# Patient Record
Sex: MALE | Race: WHITE | NOT HISPANIC OR LATINO
[De-identification: names, ages, dates, MRNs, and addresses within clinical notes are randomized per-mention and may not be internally consistent; named-entity substitution may affect disease eponyms.]

---

## 2020-05-23 ENCOUNTER — TRANSCRIPTION ENCOUNTER (OUTPATIENT)
Age: 55
End: 2020-05-23

## 2021-08-13 PROBLEM — Z00.00 ENCOUNTER FOR PREVENTIVE HEALTH EXAMINATION: Status: ACTIVE | Noted: 2021-08-13

## 2021-08-18 ENCOUNTER — APPOINTMENT (OUTPATIENT)
Dept: ELECTROPHYSIOLOGY | Facility: CLINIC | Age: 56
End: 2021-08-18
Payer: COMMERCIAL

## 2021-08-18 VITALS
HEIGHT: 71 IN | BODY MASS INDEX: 44.1 KG/M2 | WEIGHT: 315 LBS | HEART RATE: 61 BPM | SYSTOLIC BLOOD PRESSURE: 113 MMHG | DIASTOLIC BLOOD PRESSURE: 80 MMHG

## 2021-08-18 DIAGNOSIS — Z45.010 ENCOUNTER FOR CHECKING AND TESTING OF CARDIAC PACEMAKER PULSE GENERATOR [BATTERY]: ICD-10-CM

## 2021-08-18 PROCEDURE — 99203 OFFICE O/P NEW LOW 30 MIN: CPT

## 2021-08-18 PROCEDURE — 93000 ELECTROCARDIOGRAM COMPLETE: CPT

## 2021-11-16 ENCOUNTER — OUTPATIENT (OUTPATIENT)
Dept: OUTPATIENT SERVICES | Facility: HOSPITAL | Age: 56
LOS: 1 days | End: 2021-11-16
Payer: COMMERCIAL

## 2021-11-16 VITALS
TEMPERATURE: 98 F | SYSTOLIC BLOOD PRESSURE: 130 MMHG | HEART RATE: 79 BPM | OXYGEN SATURATION: 97 % | DIASTOLIC BLOOD PRESSURE: 74 MMHG | RESPIRATION RATE: 20 BRPM | WEIGHT: 315 LBS | HEIGHT: 70 IN

## 2021-11-16 DIAGNOSIS — Z96.641 PRESENCE OF RIGHT ARTIFICIAL HIP JOINT: Chronic | ICD-10-CM

## 2021-11-16 DIAGNOSIS — Z01.818 ENCOUNTER FOR OTHER PREPROCEDURAL EXAMINATION: ICD-10-CM

## 2021-11-16 DIAGNOSIS — Z95.0 PRESENCE OF CARDIAC PACEMAKER: Chronic | ICD-10-CM

## 2021-11-16 DIAGNOSIS — Z98.890 OTHER SPECIFIED POSTPROCEDURAL STATES: Chronic | ICD-10-CM

## 2021-11-16 LAB
ANION GAP SERPL CALC-SCNC: 12 MMOL/L — SIGNIFICANT CHANGE UP (ref 5–17)
APTT BLD: 38.2 SEC — HIGH (ref 27.5–35.5)
BASOPHILS # BLD AUTO: 0.06 K/UL — SIGNIFICANT CHANGE UP (ref 0–0.2)
BASOPHILS NFR BLD AUTO: 0.7 % — SIGNIFICANT CHANGE UP (ref 0–2)
BLD GP AB SCN SERPL QL: SIGNIFICANT CHANGE UP
BUN SERPL-MCNC: 11.6 MG/DL — SIGNIFICANT CHANGE UP (ref 8–20)
CALCIUM SERPL-MCNC: 8.9 MG/DL — SIGNIFICANT CHANGE UP (ref 8.6–10.2)
CHLORIDE SERPL-SCNC: 102 MMOL/L — SIGNIFICANT CHANGE UP (ref 98–107)
CO2 SERPL-SCNC: 25 MMOL/L — SIGNIFICANT CHANGE UP (ref 22–29)
CREAT SERPL-MCNC: 0.71 MG/DL — SIGNIFICANT CHANGE UP (ref 0.5–1.3)
EOSINOPHIL # BLD AUTO: 0.13 K/UL — SIGNIFICANT CHANGE UP (ref 0–0.5)
EOSINOPHIL NFR BLD AUTO: 1.6 % — SIGNIFICANT CHANGE UP (ref 0–6)
GLUCOSE SERPL-MCNC: 99 MG/DL — SIGNIFICANT CHANGE UP (ref 70–99)
HCT VFR BLD CALC: 44.9 % — SIGNIFICANT CHANGE UP (ref 39–50)
HGB BLD-MCNC: 14.5 G/DL — SIGNIFICANT CHANGE UP (ref 13–17)
IMM GRANULOCYTES NFR BLD AUTO: 0.4 % — SIGNIFICANT CHANGE UP (ref 0–1.5)
INR BLD: 1.07 RATIO — SIGNIFICANT CHANGE UP (ref 0.88–1.16)
LYMPHOCYTES # BLD AUTO: 2.24 K/UL — SIGNIFICANT CHANGE UP (ref 1–3.3)
LYMPHOCYTES # BLD AUTO: 27.4 % — SIGNIFICANT CHANGE UP (ref 13–44)
MAGNESIUM SERPL-MCNC: 2 MG/DL — SIGNIFICANT CHANGE UP (ref 1.6–2.6)
MCHC RBC-ENTMCNC: 30.9 PG — SIGNIFICANT CHANGE UP (ref 27–34)
MCHC RBC-ENTMCNC: 32.3 GM/DL — SIGNIFICANT CHANGE UP (ref 32–36)
MCV RBC AUTO: 95.5 FL — SIGNIFICANT CHANGE UP (ref 80–100)
MONOCYTES # BLD AUTO: 0.88 K/UL — SIGNIFICANT CHANGE UP (ref 0–0.9)
MONOCYTES NFR BLD AUTO: 10.8 % — SIGNIFICANT CHANGE UP (ref 2–14)
NEUTROPHILS # BLD AUTO: 4.83 K/UL — SIGNIFICANT CHANGE UP (ref 1.8–7.4)
NEUTROPHILS NFR BLD AUTO: 59.1 % — SIGNIFICANT CHANGE UP (ref 43–77)
PLATELET # BLD AUTO: 201 K/UL — SIGNIFICANT CHANGE UP (ref 150–400)
POTASSIUM SERPL-MCNC: 3.6 MMOL/L — SIGNIFICANT CHANGE UP (ref 3.5–5.3)
POTASSIUM SERPL-SCNC: 3.6 MMOL/L — SIGNIFICANT CHANGE UP (ref 3.5–5.3)
PROTHROM AB SERPL-ACNC: 12.4 SEC — SIGNIFICANT CHANGE UP (ref 10.6–13.6)
RBC # BLD: 4.7 M/UL — SIGNIFICANT CHANGE UP (ref 4.2–5.8)
RBC # FLD: 12.7 % — SIGNIFICANT CHANGE UP (ref 10.3–14.5)
SODIUM SERPL-SCNC: 139 MMOL/L — SIGNIFICANT CHANGE UP (ref 135–145)
T3 SERPL-MCNC: 105 NG/DL — SIGNIFICANT CHANGE UP (ref 80–200)
T4 AB SER-ACNC: 6.2 UG/DL — SIGNIFICANT CHANGE UP (ref 4.5–12)
TSH SERPL-MCNC: 6.76 UIU/ML — HIGH (ref 0.27–4.2)
WBC # BLD: 8.17 K/UL — SIGNIFICANT CHANGE UP (ref 3.8–10.5)
WBC # FLD AUTO: 8.17 K/UL — SIGNIFICANT CHANGE UP (ref 3.8–10.5)

## 2021-11-16 PROCEDURE — 93005 ELECTROCARDIOGRAM TRACING: CPT

## 2021-11-16 PROCEDURE — 93010 ELECTROCARDIOGRAM REPORT: CPT

## 2021-11-16 PROCEDURE — G0463: CPT

## 2021-11-16 NOTE — H&P PST ADULT - PROBLEM SELECTOR PLAN 1
CAP score 5 patient Moderate Risk,  SCDs ordered for day of procedure.  Surgical team to assess need for VTE prophylaxis

## 2021-11-16 NOTE — H&P PST ADULT - NSICDXFAMILYHX_GEN_ALL_CORE_FT
FAMILY HISTORY:  Father  Still living? Unknown  FH: dementia, Age at diagnosis: Age Unknown    Mother  Still living? Unknown  FH: CAD (coronary artery disease), Age at diagnosis: Age Unknown

## 2021-11-16 NOTE — H&P PST ADULT - HISTORY OF PRESENT ILLNESS
56 year old male with history of HTN, obesity, KAYY with CPAP, hypothyroidism and congential heart block presents today for PST for upcoming PPM change. Patient had initial PPM placed in 2004 and again in 2010. Pacemaker interrogated June 2021, showed functioning appropriately and <0.5 year battery life.  He denies palpitations, SOB, chest pain or difficulties with his pacemaker. He is now scheduled for  PPM GEN change/ [employer]/ ANES on 11/19/21 with Dr. Harding  Echocardiogram (12/22/20): LVEF 72%, LA 4.2cm, LVEF 72%, mild LVH, ascending aorta dilated 4.3cm, trace TR, RVSP 37-42 mmHg

## 2021-11-16 NOTE — H&P PST ADULT - PROBLEM SELECTOR PLAN 2
Continue medications as instructed.  Patient instructed to take levothyroxine with a sip of water morning of procedure, verbalized understanding.

## 2021-11-16 NOTE — H&P PST ADULT - PROBLEM SELECTOR PLAN 5
Labs performed. Written and verbal instructions provided, verbalized understanding.  Now scheduled for  PPM GEN change/ [employer]/ ANES on 11/19/21 with Dr. Harding  Patient educated on  COVID testing, preadmission instructions, and day of procedure medications, verbalizes understanding, teach back method utilized.  Patient instructed to stop ASA/Herbals or anti-inflammatory meds one week prior to surgery and discuss with PCP.

## 2021-11-16 NOTE — H&P PST ADULT - NSICDXPASTSURGICALHX_GEN_ALL_CORE_FT
PAST SURGICAL HISTORY:  H/O cardiac pacemaker     H/O right knee surgery     History of total right hip replacement

## 2021-11-16 NOTE — H&P PST ADULT - NSICDXPASTMEDICALHX_GEN_ALL_CORE_FT
PAST MEDICAL HISTORY:  HTN (hypertension)     Obesity, mild     Obstructive sleep apnea on CPAP     Status cardiac pacemaker

## 2021-11-16 NOTE — H&P PST ADULT - RS GEN PE MLT RESP DETAILS PC
airway patent/good air movement/respirations non-labored/clear to auscultation bilaterally/no rales/no rhonchi

## 2021-11-16 NOTE — H&P PST ADULT - ASSESSMENT
This is a pleasant obese 56 year old male with history of HTN, obesity, KAYY with CPAP, hypothyroidism and congential heart block presents today for PST for upcoming PPM change. Patient had initial PPM placed in  and again in . Pacemaker interrogated 2021, showed functioning appropriately and <0.5 year battery life.  He denies palpitations, SOB, chest pain or difficulties with his pacemaker. He is now scheduled for  PPM GEN change/ [employer]/ ANES on 21 with Dr. Harding  Echocardiogram (20): LVEF 72%, LA 4.2cm, LVEF 72%, mild LVH, ascending aorta dilated 4.3cm, trace TR, RVSP 37-42 mmHg     Plan:   Labs pending.   Pre-procedure instructions provided (verbal & written) as follows:  21   - NPO after midnight prior except meds w/ sips of water.       CAPRINI SCORE    AGE RELATED RISK FACTORS                                                             [ X] Age 41-60 years                                            (1 Point)  [ ] Age: 61-74 years                                           (2 Points)                 [ ] Age= 75 years                                                (3 Points)             DISEASE RELATED RISK FACTORS                                                       [ ] Edema in the lower extremities                 (1 Point)                     [ ] Varicose veins                                               (1 Point)                                 [X ] BMI > 25 Kg/m2                                            (1 Point)                                  [ ] Serious infection (ie PNA)                            (1 Point)                     [ ] Lung disease ( COPD, Emphysema)            (1 Point)                                                                          [ ] Acute myocardial infarction                         (1 Point)                  [ ] Congestive heart failure (in the previous month)  (1 Point)         [ ] Inflammatory bowel disease                            (1 Point)                  [ ] Central venous access, PICC or Port               (2 points)       (within the last month)                                                                [ ] Stroke (in the previous month)                        (5 Points)    [ ] Previous or present malignancy                       (2 points)                                                                                                                                                         HEMATOLOGY RELATED FACTORS                                                         [ ] Prior episodes of VTE                                     (3 Points)                     [ ] Positive family history for VTE                      (3 Points)                  [ ] Prothrombin 43667 A                                     (3 Points)                     [ ] Factor V Leiden                                                (3 Points)                        [ ] Lupus anticoagulants                                      (3 Points)                                                           [ ] Anticardiolipin antibodies                              (3 Points)                                                       [ ] High homocysteine in the blood                   (3 Points)                                             [ ] Other congenital or acquired thrombophilia      (3 Points)                                                [ ] Heparin induced thrombocytopenia                  (3 Points)                                        MOBILITY RELATED FACTORS  [ ] Bed rest                                                         (1 Point)  [ ] Plaster cast                                                    (2 points)  [ ] Bed bound for more than 72 hours           (2 Points)    GENDER SPECIFIC FACTORS  [ ] Pregnancy or had a baby within the last month   (1 Point)  [ ] Post-partum < 6 weeks                                   (1 Point)  [ ] Hormonal therapy  or oral contraception   (1 Point)  [ ] History of pregnancy complications              (1 point)  [ ] Unexplained or recurrent              (1 Point)    OTHER RISK FACTORS                                           (1 Point)  [X] BMI >40, smoking, diabetes requiring insulin, chemotherapy  blood transfusions and length of surgery over 2 hours    SURGERY RELATED RISK FACTORS  [ ]  Section within the last month     (1 Point)  [ ] Minor surgery                                                  (1 Point)  [ ] Arthroscopic surgery                                       (2 Points)  [X ] Planned major surgery lasting more            (2 Points)      than 45 minutes     [ ] Elective hip or knee joint replacement       (5 points)       surgery                                                TRAUMA RELATED RISK FACTORS  [ ] Fracture of the hip, pelvis, or leg                       (5 Points)  [ ] Spinal cord injury resulting in paralysis             (5 points)       (in the previous month)    [ ] Paralysis  (less than 1 month)                             (5 Points)  [ ] Multiple Trauma within 1 month                        (5 Points)    Total Score [      5  ]    Caprini Score 0-2: Low Risk, NO VTE prophylaxis required for most patients, encourage ambulation  Caprini Score 3-6: Moderate Risk , pharmacologic VTE prophylaxis is indicated for most patients (in the absence of contraindications)  Caprini Score Greater than or =7: High risk, pharmocologic VTE prophylaxis indicated for most patients (in the absence of contraindications)        OPIOID RISK TOOL    [place] EACH BOX THAT APPLIES AND ADD TOTALS AT THE END    FAMILY HISTORY OF SUBSTANCE ABUSE                 FEMALE         MALE                                                Alcohol                             [  ]1 pt          [  ]3pts                                               Illegal Durgs                     [  ]2 pts        [  ]3pts                                               Rx Drugs                           [  ]4 pts        [  ]4 pts    PERSONAL HISTORY OF SUBSTANCE ABUSE                                                                                          Alcohol                             [  ]3 pts       [  ]3 pts                                               Illegal Drugs                     [  ]4 pts        [  ]4 pts                                               Rx Drugs                           [  ]5 pts        [  ]5 pts    AGE BETWEEN 16-45 YEARS                                      [  ]1 pt         [  ]1 pt    HISTORY OF PREADOLESCENT   SEXUAL ABUSE                                                             [  ]3 pts        [  ]0pts    PSYCHOLOGICAL DISEASE                     ADD, OCD, Bipolar, Schizophrenia        [  ]2 pts         [  ]2 pts                      Depression                                               [  ]1 pt           [  ]1 pt           SCORING TOTAL   (add numbers and type here)              (*0**)                                     A score of 3 or lower indicated LOW risk for future opioid abuse  A score of 4 to 7 indicated moderate risk for future opioid abuse  A score of 8 or higher indicates a high risk for opioid abuse

## 2021-11-16 NOTE — H&P PST ADULT - PROBLEM SELECTOR PLAN 3
BP today 130/74. Continue medications as instructed.  Patient instructed to take blood pressure medication with a sip of water day of procedure, verbalized understanding.

## 2021-11-17 DIAGNOSIS — Z13.89 ENCOUNTER FOR SCREENING FOR OTHER DISORDER: ICD-10-CM

## 2021-11-17 DIAGNOSIS — Z29.9 ENCOUNTER FOR PROPHYLACTIC MEASURES, UNSPECIFIED: ICD-10-CM

## 2021-11-17 DIAGNOSIS — I10 ESSENTIAL (PRIMARY) HYPERTENSION: ICD-10-CM

## 2021-11-17 DIAGNOSIS — E03.9 HYPOTHYROIDISM, UNSPECIFIED: ICD-10-CM

## 2021-11-17 DIAGNOSIS — Z45.010 ENCOUNTER FOR CHECKING AND TESTING OF CARDIAC PACEMAKER PULSE GENERATOR [BATTERY]: ICD-10-CM

## 2021-11-17 LAB — SARS-COV-2 RNA SPEC QL NAA+PROBE: SIGNIFICANT CHANGE UP

## 2021-11-18 DIAGNOSIS — E03.9 HYPOTHYROIDISM, UNSPECIFIED: ICD-10-CM

## 2021-11-18 DIAGNOSIS — Z82.49 FAMILY HISTORY OF ISCHEMIC HEART DISEASE AND OTHER DISEASES OF THE CIRCULATORY SYSTEM: ICD-10-CM

## 2021-11-18 DIAGNOSIS — I35.2 NONRHEUMATIC AORTIC (VALVE) STENOSIS WITH INSUFFICIENCY: ICD-10-CM

## 2021-11-18 DIAGNOSIS — I07.1 RHEUMATIC TRICUSPID INSUFFICIENCY: ICD-10-CM

## 2021-11-18 DIAGNOSIS — I51.7 CARDIOMEGALY: ICD-10-CM

## 2021-11-18 DIAGNOSIS — I51.89 OTHER ILL-DEFINED HEART DISEASES: ICD-10-CM

## 2021-11-18 DIAGNOSIS — E66.9 OBESITY, UNSPECIFIED: ICD-10-CM

## 2021-11-18 DIAGNOSIS — I10 ESSENTIAL (PRIMARY) HYPERTENSION: ICD-10-CM

## 2021-11-18 DIAGNOSIS — Z72.89 OTHER PROBLEMS RELATED TO LIFESTYLE: ICD-10-CM

## 2021-11-18 RX ORDER — LEVOTHYROXINE SODIUM 0.05 MG/1
50 TABLET ORAL DAILY
Qty: 90 | Refills: 3 | Status: ACTIVE | COMMUNITY
Start: 2021-11-18

## 2021-11-18 RX ORDER — LOSARTAN POTASSIUM AND HYDROCHLOROTHIAZIDE 12.5; 5 MG/1; MG/1
50-12.5 TABLET ORAL DAILY
Qty: 90 | Refills: 2 | Status: ACTIVE | COMMUNITY
Start: 2021-11-18

## 2021-11-19 ENCOUNTER — OUTPATIENT (OUTPATIENT)
Dept: OUTPATIENT SERVICES | Facility: HOSPITAL | Age: 56
LOS: 1 days | Discharge: HOME | End: 2021-11-19
Payer: COMMERCIAL

## 2021-11-19 ENCOUNTER — TRANSCRIPTION ENCOUNTER (OUTPATIENT)
Age: 56
End: 2021-11-19

## 2021-11-19 VITALS
OXYGEN SATURATION: 95 % | HEART RATE: 63 BPM | RESPIRATION RATE: 18 BRPM | SYSTOLIC BLOOD PRESSURE: 144 MMHG | DIASTOLIC BLOOD PRESSURE: 80 MMHG

## 2021-11-19 VITALS
OXYGEN SATURATION: 99 % | DIASTOLIC BLOOD PRESSURE: 82 MMHG | TEMPERATURE: 98 F | SYSTOLIC BLOOD PRESSURE: 147 MMHG | HEART RATE: 68 BPM | RESPIRATION RATE: 21 BRPM | WEIGHT: 315 LBS | HEIGHT: 71 IN

## 2021-11-19 DIAGNOSIS — Z98.890 OTHER SPECIFIED POSTPROCEDURAL STATES: Chronic | ICD-10-CM

## 2021-11-19 DIAGNOSIS — Z96.641 PRESENCE OF RIGHT ARTIFICIAL HIP JOINT: Chronic | ICD-10-CM

## 2021-11-19 DIAGNOSIS — Z95.0 PRESENCE OF CARDIAC PACEMAKER: Chronic | ICD-10-CM

## 2021-11-19 DIAGNOSIS — Z45.010 ENCOUNTER FOR CHECKING AND TESTING OF CARDIAC PACEMAKER PULSE GENERATOR [BATTERY]: ICD-10-CM

## 2021-11-19 PROCEDURE — 33228 REMV&REPLC PM GEN DUAL LEAD: CPT

## 2021-11-19 PROCEDURE — C1889: CPT

## 2021-11-19 PROCEDURE — C1785: CPT

## 2021-11-19 NOTE — DISCHARGE NOTE PROVIDER - HOSPITAL COURSE
56 year old male with history of HTN, obesity, KAYY with CPAP, hypothyroidism and congential heart block s/p PPM implant (initial 2004, gen change 2010). Recent device interrogation revealed battery is at BRENDA. He presented electively and is now status post uncomplicated [employer] PPM generator change. The patient was observed per post procedure protocol, then discharged home with a plan for outpatient follow up.

## 2021-11-19 NOTE — DISCHARGE NOTE NURSING/CASE MANAGEMENT/SOCIAL WORK - PATIENT PORTAL LINK FT
You can access the FollowMyHealth Patient Portal offered by [facility] by registering at the following website: [email or web address]. By joining [employer]’s FollowMyHealth portal, you will also be able to view your health information using other applications (apps) compatible with our system.

## 2021-11-19 NOTE — DISCHARGE NOTE PROVIDER - NSDCFUADDINST_GEN_ALL_CORE_FT
Follow up with Dr. Harding in 3-4 weeks. Our office will contact you in 3-5 days to schedule this appointment. Please call [phone] with questions or concerns.

## 2021-11-19 NOTE — DISCHARGE NOTE PROVIDER - CARE PROVIDER_API CALL
Charanjit Harding)  Cardiology; Internal Medicine  [address] [facility], [address] [place] [address]  Phone: [phone]  Fax: [phone]  Follow Up Time:

## 2021-11-19 NOTE — DISCHARGE NOTE PROVIDER - NSDCMRMEDTOKEN_GEN_ALL_CORE_FT
levothyroxine 175 mcg (0.175 mg) oral tablet: 1 tab(s) orally once a day  losartan-hydrochlorothiazide 50 mg-12.5 mg oral tablet: 1 tab(s) orally once a day

## 2021-11-19 NOTE — DISCHARGE NOTE PROVIDER - NSDCCPTREATMENT_GEN_ALL_CORE_FT
PRINCIPAL PROCEDURE  Procedure: Replacement of dual chamber pacemaker generator  Findings and Treatment: Cardiac Device Implant Post Operative Instructions  - Do not touch the incision until it is completely healed.   - There are Dermabondon your incision, which will start to peel off on their own over the next 2-3 weeks. Do not pick at or peel off the Dermabond.  - Bruising around the implant site or over the chest, side or arm near the incision is normal, and will take a few weeks to resolve.  -Do not lift the affected arm higher than 90 degrees (shoulder height) in any direction for 4 weeks.   - Do not push, pull or lift anything heavier than 10 lbs (about a gallon of milk) with the affected arm for 4 weeks.     - Do not apply soaps, creams, lotions, ointments or powders to the incision until it is completely healed.  - You may take a shower in 24 hours, and allow the water to run over the incision. However, do not submerge the incision in water: do not swim or soak in bath tubs, hot tubs, swimming pools, etc.   You should call the doctor if:   - You notice redness, drainage, swelling, increased tenderness, hot sensation around the incision, bleeding or incision edges pulling apart.  - Your temperature is greater than 100 degrees F for more than 24 hours.  - You notice swelling or bulging at the incision or around the device that was not there when you left the hospital or is increasing in size.  - You experience increased difficulty breathing.  - You notice new/worsening swelling in your legs and ankles.  - You faint or have dizzy spells.  - You have any questions or concerns regarding your device or the procedure.

## 2021-11-19 NOTE — PROGRESS NOTE ADULT - SUBJECTIVE AND OBJECTIVE BOX
PROCEDURE(S): [employer] PM Generator Change    ELECTROPHYSIOLOGIST(S): Charanjit Harding MD    COMPLICATIONS:  none          DISPOSITION:  Observation Unit           CONDITION: Stable    Pt doing well s/p [employer] PM generator change. Denies complaint post procedure.     PAST MEDICAL & SURGICAL HISTORY:  HTN (hypertension)  Obesity, mild  Obstructive sleep apnea on CPAP  Status cardiac pacemaker  Hypothyroidism  History of total right hip replacement  H/O right knee surgery  H/O cardiac pacemaker    Post-procedure VS: /76 HR 69 O2 sat 96% RR 16   awake, alert, no distress  Incision: Dermabond C/D/I; no bleeding, hematoma, erythema or edema  Card: S1/S2, RRR, no m/g/r  Resp: lungs CTA b/l  Abd: S/NT/ND  Ext: no edema, distal pulses intact    Assessment:   56 year old male with history of HTN, obesity, KAYY with CPAP, hypothyroidism and congential heart block s/p PPM implant (initial 2004, gen change 2010). Recent device interrogation revealed battery is at BRENDA. He presented electively and is now status post uncomplicated [employer] PPM generator change.     Plan:   Observation on telemetry per post op protocol.   Resume PO intake.   OOB w/ assist once pt fully awake & VSS.   Pain control with PO analgesia PRN.   Resume home medications.  Pt instructed as to incision care and f/up.   Anticipate d/c home today once all criteria met, with outpt f/up in 1-2 weeks.

## 2021-11-30 PROBLEM — E66.9 OBESITY, UNSPECIFIED: Chronic | Status: ACTIVE | Noted: 2021-11-16

## 2021-11-30 PROBLEM — I10 ESSENTIAL (PRIMARY) HYPERTENSION: Chronic | Status: ACTIVE | Noted: 2021-11-16

## 2021-11-30 PROBLEM — E03.9 HYPOTHYROIDISM, UNSPECIFIED: Chronic | Status: ACTIVE | Noted: 2021-11-17

## 2021-11-30 PROBLEM — G47.33 OBSTRUCTIVE SLEEP APNEA (ADULT) (PEDIATRIC): Chronic | Status: ACTIVE | Noted: 2021-11-16

## 2021-11-30 PROBLEM — Z95.0 PRESENCE OF CARDIAC PACEMAKER: Chronic | Status: ACTIVE | Noted: 2021-11-16

## 2022-02-22 ENCOUNTER — APPOINTMENT (OUTPATIENT)
Dept: ELECTROPHYSIOLOGY | Facility: CLINIC | Age: 57
End: 2022-02-22

## 2022-06-16 NOTE — DISCUSSION/SUMMARY
[FreeTextEntry1] :  56 year old gentleman with history of HTN, obesity, KAYY, pulm htn, hypothyroidism, and congenital heart block s/p ppm implant presenting for pacemaker management. He has been pacemaker dependent since original device implant in 2004. His pacemaker is functioning normally, and is now approaching BRENDA, and he will require ppm generator replacement. We did discuss that though his pacemaker leads are functioning normally, prophylactic lead extraction/reimplant is sometimes considered in pts who receive ppms at young ages given the likelihood of future lead failure, but that at this time ppm generator replacement alone is all that will be urgently needed. He will consider this possibility(of lead extraction/reimplant) further with his family and medical team. For now will plan PPM generator replacement. Unfortunately the MDT 5092 lead is not MRI conditional at this time. The risks and benefits of pacemaker generator replacement were reviewed, including procedure related risks such as bleeding and infection.  \par \par -ppm gen change, in about 3 months or sooner when triggers BRENDA.

## 2022-06-16 NOTE — HISTORY OF PRESENT ILLNESS
[FreeTextEntry1] : 56 year old gentleman with history of HTN, obesity, KAYY, pulm htn, hypothyroidism, and congenital heart block s/p ppm implant presenting for pacemaker management.  \par \par  He was noted to have heart block when in high school, which was initially asymptomatic, and he followed at [facility]. In 2004 he developed lightheadedness and a dual chamber ppm was implanted at [facility]. In 2010 he had near syncope and was told the ppm needed to be changed urgently, and a new ppm generator was implanted 10/29/2010 at [facility]. Since that time he has been feeling well.  \par \par He has been pacemaker dependent with 100% ventricular pacing, and 10% atrial pacing in DDD 60 mode. Lead function has been stable and within normal limits (the atrial lead is a MDT 5092, and the RV lead is a MDT 5087, both implanted 7/14/2004). There is no intrinsic Rw > 30 bpm, and RA threshold is 0.5V@0.5ms, and RV 1V@0.4ms. The device is a [facility] and currently has <0.5 yrs until BRENDA. Brief supraventricular arrhythmia episodes have noted mostly lasting <11 seconds, longest 45 seconds in 2011.  \par \par ECG today reveals sinus rhythm with ventricular pacing.  \par \par Current meds HCTZ, losartan, synthroid

## 2022-06-16 NOTE — CARDIOLOGY SUMMARY
[de-identified] : 8/18/21 sinus rhythm with ventricular pacing 61 bpm [de-identified] : TTE 12/22/20 LVEF 72%, LA 4.2cm, LVEF 72%, mild LVH, ascending aorta dilated 4.3cm, trace TR, RVSP 37-42 mmHg

## 2022-06-16 NOTE — REVIEW OF SYSTEMS
[Joint Pain] : joint pain [Negative] : Heme/Lymph [SOB] : no shortness of breath [Dyspnea on exertion] : not dyspnea during exertion [Chest Discomfort] : no chest discomfort [Leg Claudication] : no intermittent leg claudication [Palpitations] : no palpitations [Syncope] : no syncope [Dizziness] : no dizziness [Convulsions] : no convulsions [Confusion] : no confusion was observed [Under Stress] : not under stress

## 2022-06-16 NOTE — PHYSICAL EXAM
[Well Developed] : well developed [Well Nourished] : well nourished [No Acute Distress] : no acute distress [Obese] : obese [Normal Conjunctiva] : normal conjunctiva [Normal Venous Pressure] : normal venous pressure [Normal S1, S2] : normal S1, S2 [No Murmur] : no murmur [Clear Lung Fields] : clear lung fields [Good Air Entry] : good air entry [No Respiratory Distress] : no respiratory distress  [Soft] : abdomen soft [Normal Gait] : normal gait [Moves all extremities] : moves all extremities [Alert and Oriented] : alert and oriented [de-identified] : trace edema [de-identified] : left sided ppm implant site well healed, two scars, no venous prominence

## 2022-06-17 DIAGNOSIS — Z86.79 PERSONAL HISTORY OF OTHER DISEASES OF THE CIRCULATORY SYSTEM: ICD-10-CM

## 2022-06-22 ENCOUNTER — APPOINTMENT (OUTPATIENT)
Dept: ELECTROPHYSIOLOGY | Facility: CLINIC | Age: 57
End: 2022-06-22

## 2022-06-22 ENCOUNTER — APPOINTMENT (OUTPATIENT)
Dept: ELECTROPHYSIOLOGY | Facility: CLINIC | Age: 57
End: 2022-06-22
Payer: COMMERCIAL

## 2022-06-22 VITALS
DIASTOLIC BLOOD PRESSURE: 76 MMHG | HEIGHT: 71 IN | WEIGHT: 315 LBS | SYSTOLIC BLOOD PRESSURE: 126 MMHG | BODY MASS INDEX: 44.1 KG/M2 | HEART RATE: 67 BPM

## 2022-06-22 PROCEDURE — 93000 ELECTROCARDIOGRAM COMPLETE: CPT

## 2022-06-22 PROCEDURE — 99215 OFFICE O/P EST HI 40 MIN: CPT

## 2022-06-22 NOTE — REVIEW OF SYSTEMS
[SOB] : no shortness of breath [Dyspnea on exertion] : not dyspnea during exertion [Chest Discomfort] : no chest discomfort [Leg Claudication] : no intermittent leg claudication [Palpitations] : no palpitations [Syncope] : no syncope [Joint Pain] : joint pain [Dizziness] : no dizziness [Convulsions] : no convulsions [Confusion] : no confusion was observed [Under Stress] : not under stress [Negative] : Heme/Lymph

## 2022-06-22 NOTE — DISCUSSION/SUMMARY
[FreeTextEntry1] : 57 year old gentleman with history of HTN, obesity, KAYY, pulm htn, hypothyroidism, and congenital heart block s/p dual chamber ppm 2004 and generator replacement 11/19/2021, now presenting for evaluation of suspected pacemaker lead malfunction.  There is now evidence of pacemaker lead malfunction, with artifact on the RV channel resulting in temporary oversensing. While no further oversensing has occurred since the sensitivity was adjusted, I am concerned this is a sign of impending RV lead failure, and as he is pacemaker dependent he will require lead revision before the lead becomes unreliable. Options for management including lead revision with addition of a new RV pacing lead and abandonment of the malfunctioned lead, as well as lead extraction and new system implant were reviewed in detail, including the risks and benefits of each approach both in terms of short-term complications from the procedure of device removal, as well as long-term considerations including increased risks of infection and venous obstruction with additional transvenous leads, as well as implications for MRI compatibility. After discussion, he and his wife would like to consider these options further. I have suggested consultation with Dr Sae Blanchard for further consideration as well. He will call when he makes a decision, and will then plan either ppm lead revision at [facility], or lead extraction/reimplant. \par \par -f/u TTE and stress test. Continue beta blockers as tolerated. At present no indication for ICD \par \par -referred to Dr Blanchard as above to discuss potential lead extraction/reimplant.  \par \par

## 2022-06-22 NOTE — CARDIOLOGY SUMMARY
[de-identified] : 6/22/22 sinus rhythm with ventricular pacing 67 bpm\par 8/18/21 sinus rhythm with ventricular pacing 61 bpm [de-identified] : TTE 12/22/20 LVEF 72%, LA 4.2cm, LVEF 72%, mild LVH, ascending aorta dilated 4.3cm, trace TR, RVSP 37-42 mmHg

## 2022-06-22 NOTE — HISTORY OF PRESENT ILLNESS
[FreeTextEntry1] : 57 year old gentleman with history of HTN, obesity, KAYY, pulm htn, hypothyroidism, and congenital heart block s/p dual chamber ppm 2004 and generator replacement 11/19/2021, now presenting for evaluation of suspected pacemaker lead malfunction.   \par \par    \par \par He was noted to have heart block when in high school, which was initially asymptomatic, and he followed at [facility]. In 2004 he developed lightheadedness and a dual chamber ppm was implanted at [facility]. In 2010 he had near syncope and  premature battery depletion, and a new ppm generator was implanted 10/29/2010 at [facility]. Since that time he has been feeling well, and has remained pacemaker dependent.   \par \par He was seen last year as his device was approaching [facility], and he underwent uncomplicated dual chamber ppm generator replacement (BSc) on 11/19/2021.   \par \par Since that time he has been feeling generally well. He had been noted to have NSVT, and a TTE and stress test are pending today. He has no history of CAD, and previous TTE in 2020 noted preserved LV function.  \par \par Recently there have been episodes of noise on the ventricular pacing lead, resulting in oversensing and pacing inhibition. This occurred on 5/1/22 and 5/23/22, and both were asymptomatic. The latter episode did occur while traveling (possibly in the airport), but without clear precipitating factors.  \par \par On interrogation he has had reproducibility of the artifact with arm movement. After that the device was reprogrammed, and sensitivity of the RV lead increased to 5mV, after which no further recorded oversensing events have occurred.  \par \par Interrogation of his dual chamber BSc pacemaker was performed today. \par He has been pacemaker dependent with 99% ventricular pacing (no underlying R-w >30 bpm), and 5% atrial pacing in DDD 60 mode. The atrial lead is a MDT 5092, and the RV lead is a MDT 5023, both implanted 7/14/2004), and have normal electrical parameters There is no intrinsic Rw > 30 bpm, and RA threshold is 0.5V@0.5ms, and RV 1V@0.4ms. RV impedance is stable at 564 ohm. There was 19 beat of NSVT noted 5/31/22 at 176 bpm. Battery longevity is 12 years.    Episodes of artifact on the ventricular channel are noted and are reproducible with arm movement- with current settings of increased sensitivity, this is not resulting in oversensing at this time. \par \par ECG today reveals sinus rhythm with ventricular pacing.    \par \par    \par \par Current meds HCTZ, losartan, synthroid

## 2022-08-17 DIAGNOSIS — Z01.818 ENCOUNTER FOR OTHER PREPROCEDURAL EXAMINATION: ICD-10-CM

## 2022-08-22 ENCOUNTER — NON-APPOINTMENT (OUTPATIENT)
Age: 57
End: 2022-08-22

## 2022-08-22 ENCOUNTER — OUTPATIENT (OUTPATIENT)
Dept: OUTPATIENT SERVICES | Facility: HOSPITAL | Age: 57
LOS: 1 days | End: 2022-08-22
Payer: COMMERCIAL

## 2022-08-22 ENCOUNTER — APPOINTMENT (OUTPATIENT)
Dept: ELECTROPHYSIOLOGY | Facility: CLINIC | Age: 57
End: 2022-08-22

## 2022-08-22 VITALS
HEART RATE: 68 BPM | HEIGHT: 71 IN | SYSTOLIC BLOOD PRESSURE: 117 MMHG | DIASTOLIC BLOOD PRESSURE: 78 MMHG | WEIGHT: 315 LBS | RESPIRATION RATE: 14 BRPM | BODY MASS INDEX: 44.1 KG/M2 | OXYGEN SATURATION: 97 %

## 2022-08-22 DIAGNOSIS — Z01.818 ENCOUNTER FOR OTHER PREPROCEDURAL EXAMINATION: ICD-10-CM

## 2022-08-22 DIAGNOSIS — Q24.6 CONGENITAL HEART BLOCK: ICD-10-CM

## 2022-08-22 DIAGNOSIS — T82.110A BREAKDOWN (MECHANICAL) OF CARDIAC ELECTRODE, INITIAL ENCOUNTER: ICD-10-CM

## 2022-08-22 DIAGNOSIS — Z98.890 OTHER SPECIFIED POSTPROCEDURAL STATES: Chronic | ICD-10-CM

## 2022-08-22 DIAGNOSIS — Z96.641 PRESENCE OF RIGHT ARTIFICIAL HIP JOINT: Chronic | ICD-10-CM

## 2022-08-22 DIAGNOSIS — Z95.0 PRESENCE OF CARDIAC PACEMAKER: Chronic | ICD-10-CM

## 2022-08-22 PROCEDURE — 99214 OFFICE O/P EST MOD 30 MIN: CPT | Mod: 25

## 2022-08-22 PROCEDURE — 93000 ELECTROCARDIOGRAM COMPLETE: CPT | Mod: 59

## 2022-08-22 PROCEDURE — 71046 X-RAY EXAM CHEST 2 VIEWS: CPT | Mod: 26

## 2022-08-22 PROCEDURE — 93280 PM DEVICE PROGR EVAL DUAL: CPT

## 2022-08-22 PROCEDURE — 71046 X-RAY EXAM CHEST 2 VIEWS: CPT

## 2022-08-23 NOTE — HISTORY OF PRESENT ILLNESS
[FreeTextEntry1] : I had the pleasure of seeing your patient Will Ching today in the arrhythmia clinic of [facility]. As you well know the patient is a delightful 57 year old gentleman with history of HTN, obesity, KAYY, pulm htn, hypothyroidism, and congenital heart block. He is underwent dual chamber pacemaker implantation in 2004 with subsequent generator replacements in 2010 and 11/19/2021. He is now presenting for evaluation of suspected pacemaker lead malfunction.   \par \par The patient was first was noted to have heart block when in high school, which was initially asymptomatic. In 2004 he developed lightheadedness and a dual chamber pacemaker was implanted at [facility]. In 2010 he had near syncope and premature battery depletion, and a new pacemaker was implanted 10/29/2010 at [facility]. Since that time he has been feeling well, and has remained pacemaker dependent. He underwent uncomplicated dual chamber ppm generator replacement (BSc) on 11/19/2021.   \par \par Since that time he has been feeling generally well. He had been noted to have NSVT, He has no history of CAD, and previous TTE in 2020 noted preserved LV function.  \par \par Recently there have been episodes of noise on the ventricular pacing lead, resulting in oversensing and pacing inhibition. This occurred on 5/1/22 and 5/23/22, and both were asymptomatic. The latter episode did occur while traveling (possibly in the airport), but without clear precipitating factors.  On interrogation he has had reproducibility of the artifact with arm movement. After that the device was reprogrammed, and sensitivity of the RV lead increased to 5mV, after which no further recorded oversensing events have occurred.  \par \par Today in clinic he is overall feeling well. He denies any palpitations, lightheadedness, presyncope or syncope. His BP today was 117/78 with a pulse of 68. His ECG demonstrated sinus rhythm with RV pacing and a wide QRS of  163msec. Interrogation of his dual chamber BSc pacemaker was performed today. \par He has been pacemaker dependent with 99% ventricular pacing (no underlying R-w >30 bpm), and 5% atrial pacing in DDD 60 mode. The atrial lead is a MDT 5092, and the RV lead is a MDT 5045, both implanted 7/14/2004, and have normal electrical parameters) There is no intrinsic Rw > 30 bpm, and RA threshold is 0.5V@0.4ms, and RV 1.1V@0.4ms. RV impedance is stable at 564 ohm. Battery longevity is 12.5 years.  There were no further episodes of noise seen.

## 2022-08-23 NOTE — DISCUSSION/SUMMARY
[FreeTextEntry1] : In summary the patient is a 57 year old gentleman with history of HTN, obesity, KAYY, pulm htn, hypothyroidism, and congenital heart block s/p dual chamber ppm 2004 and generator replacement 11/19/2021. There is now evidence of pacemaker lead malfunction. While no further oversensing has occurred since the sensitivity was adjusted, the patient is PPM dependent and therefore requires a revision. We discussed the options of lead abandonment, with addition of a new lead and lead extraction with replacement. Adding a lead would only be possible if the ipsilateral side was patent, otherwise it would require abandoning the left side and placing a new system on the right (or tunneling a lead).  The procedures, outcomes and risks of both approaches were discussed in detail. The risks of extraction reviewed included, but were not limited to, bleeding, infection, AV fistula, vascular/cardiac tear, valvular damage, need for emergent surgery and death. After all questions were answered, in a shared decision-making manner, the patient has decided to proceed with extraction and reimplantation. \par   [EKG obtained to assist in diagnosis and management of assessed problem(s)] : EKG obtained to assist in diagnosis and management of assessed problem(s)

## 2022-10-02 NOTE — ASU PATIENT PROFILE, ADULT - HAVE YOU HAD A FIRST COVID-19 BOOSTER?
Problem: Plan of Care - These are the overarching goals to be used throughout the patient stay.    Goal: Optimal Comfort and Wellbeing  Outcome: Ongoing, Progressing     Problem: Restraint, Nonbehavioral (Nonviolent)  Goal: Absence of Harm or Injury  Outcome: Ongoing, Progressing  Intervention: Protect Skin and Joint Integrity  Recent Flowsheet Documentation  Taken 10/2/2022 0030 by Nichole Saini RN  Range of Motion: ROM (range of motion) performed     Problem: Skin and Tissue Injury (Artificial Airway)  Goal: Absence of Device-Related Skin or Tissue Injury  Outcome: Ongoing, Progressing   Goal Outcome Evaluation:    Pt alert, disoriented to situation and time. Vitals stable. Q6 accuchecks, 1 unit insulin given at 0600. Pt slept through most of shift but moved legs around frequently in bed, putting them between side rails before side rails moved closer together. Continuous tube feeding ran overnight.        Yes

## 2022-10-12 ENCOUNTER — OUTPATIENT (OUTPATIENT)
Dept: OUTPATIENT SERVICES | Facility: HOSPITAL | Age: 57
LOS: 1 days | End: 2022-10-12
Payer: COMMERCIAL

## 2022-10-12 VITALS
HEIGHT: 71 IN | TEMPERATURE: 99 F | OXYGEN SATURATION: 96 % | DIASTOLIC BLOOD PRESSURE: 78 MMHG | RESPIRATION RATE: 16 BRPM | HEART RATE: 73 BPM | WEIGHT: 315 LBS | SYSTOLIC BLOOD PRESSURE: 116 MMHG

## 2022-10-12 DIAGNOSIS — E03.9 HYPOTHYROIDISM, UNSPECIFIED: ICD-10-CM

## 2022-10-12 DIAGNOSIS — Z95.0 PRESENCE OF CARDIAC PACEMAKER: Chronic | ICD-10-CM

## 2022-10-12 DIAGNOSIS — T82.110A BREAKDOWN (MECHANICAL) OF CARDIAC ELECTRODE, INITIAL ENCOUNTER: ICD-10-CM

## 2022-10-12 DIAGNOSIS — Z01.818 ENCOUNTER FOR OTHER PREPROCEDURAL EXAMINATION: ICD-10-CM

## 2022-10-12 DIAGNOSIS — Z98.890 OTHER SPECIFIED POSTPROCEDURAL STATES: Chronic | ICD-10-CM

## 2022-10-12 DIAGNOSIS — Z96.641 PRESENCE OF RIGHT ARTIFICIAL HIP JOINT: Chronic | ICD-10-CM

## 2022-10-12 DIAGNOSIS — Z29.9 ENCOUNTER FOR PROPHYLACTIC MEASURES, UNSPECIFIED: ICD-10-CM

## 2022-10-12 DIAGNOSIS — G47.33 OBSTRUCTIVE SLEEP APNEA (ADULT) (PEDIATRIC): ICD-10-CM

## 2022-10-12 DIAGNOSIS — I10 ESSENTIAL (PRIMARY) HYPERTENSION: ICD-10-CM

## 2022-10-12 LAB
A1C WITH ESTIMATED AVERAGE GLUCOSE RESULT: 5.5 % — SIGNIFICANT CHANGE UP (ref 4–5.6)
ANION GAP SERPL CALC-SCNC: 11 MMOL/L — SIGNIFICANT CHANGE UP (ref 5–17)
BLD GP AB SCN SERPL QL: NEGATIVE — SIGNIFICANT CHANGE UP
BUN SERPL-MCNC: 14 MG/DL — SIGNIFICANT CHANGE UP (ref 7–23)
CALCIUM SERPL-MCNC: 9.2 MG/DL — SIGNIFICANT CHANGE UP (ref 8.4–10.5)
CHLORIDE SERPL-SCNC: 99 MMOL/L — SIGNIFICANT CHANGE UP (ref 96–108)
CO2 SERPL-SCNC: 27 MMOL/L — SIGNIFICANT CHANGE UP (ref 22–31)
CREAT SERPL-MCNC: 0.83 MG/DL — SIGNIFICANT CHANGE UP (ref 0.5–1.3)
EGFR: 103 ML/MIN/1.73M2 — SIGNIFICANT CHANGE UP
ESTIMATED AVERAGE GLUCOSE: 111 MG/DL — SIGNIFICANT CHANGE UP (ref 68–114)
GLUCOSE SERPL-MCNC: 91 MG/DL — SIGNIFICANT CHANGE UP (ref 70–99)
HCT VFR BLD CALC: 46.7 % — SIGNIFICANT CHANGE UP (ref 39–50)
HGB BLD-MCNC: 15.2 G/DL — SIGNIFICANT CHANGE UP (ref 13–17)
MCHC RBC-ENTMCNC: 30.8 PG — SIGNIFICANT CHANGE UP (ref 27–34)
MCHC RBC-ENTMCNC: 32.5 GM/DL — SIGNIFICANT CHANGE UP (ref 32–36)
MCV RBC AUTO: 94.5 FL — SIGNIFICANT CHANGE UP (ref 80–100)
NRBC # BLD: 0 /100 WBCS — SIGNIFICANT CHANGE UP (ref 0–0)
PLATELET # BLD AUTO: 201 K/UL — SIGNIFICANT CHANGE UP (ref 150–400)
POTASSIUM SERPL-MCNC: 3.5 MMOL/L — SIGNIFICANT CHANGE UP (ref 3.5–5.3)
POTASSIUM SERPL-SCNC: 3.5 MMOL/L — SIGNIFICANT CHANGE UP (ref 3.5–5.3)
RBC # BLD: 4.94 M/UL — SIGNIFICANT CHANGE UP (ref 4.2–5.8)
RBC # FLD: 12.8 % — SIGNIFICANT CHANGE UP (ref 10.3–14.5)
RH IG SCN BLD-IMP: POSITIVE — SIGNIFICANT CHANGE UP
SODIUM SERPL-SCNC: 137 MMOL/L — SIGNIFICANT CHANGE UP (ref 135–145)
WBC # BLD: 8.44 K/UL — SIGNIFICANT CHANGE UP (ref 3.8–10.5)
WBC # FLD AUTO: 8.44 K/UL — SIGNIFICANT CHANGE UP (ref 3.8–10.5)

## 2022-10-12 PROCEDURE — 80048 BASIC METABOLIC PNL TOTAL CA: CPT

## 2022-10-12 PROCEDURE — 85027 COMPLETE CBC AUTOMATED: CPT

## 2022-10-12 PROCEDURE — G0463: CPT

## 2022-10-12 PROCEDURE — 71046 X-RAY EXAM CHEST 2 VIEWS: CPT

## 2022-10-12 PROCEDURE — 86850 RBC ANTIBODY SCREEN: CPT

## 2022-10-12 PROCEDURE — 86901 BLOOD TYPING SEROLOGIC RH(D): CPT

## 2022-10-12 PROCEDURE — 83036 HEMOGLOBIN GLYCOSYLATED A1C: CPT

## 2022-10-12 PROCEDURE — 71046 X-RAY EXAM CHEST 2 VIEWS: CPT | Mod: 26

## 2022-10-12 PROCEDURE — 86900 BLOOD TYPING SEROLOGIC ABO: CPT

## 2022-10-12 RX ORDER — CEFUROXIME AXETIL 250 MG
1500 TABLET ORAL ONCE
Refills: 0 | Status: DISCONTINUED | OUTPATIENT
Start: 2022-10-25 | End: 2022-10-25

## 2022-10-12 NOTE — H&P PST ADULT - ASSESSMENT
DARRELLI VTE 2.0 SCORE [CLOT updated 2019]    AGE RELATED RISK FACTORS                                                       MOBILITY RELATED FACTORS  [ ] Age 41-60 years                                            (1 Point)                    [ ] Bed rest                                                        (1 Point)  [ ] Age: 61-74 years                                           (2 Points)                  [ ] Plaster cast                                                   (2 Points)  [ ] Age= 75 years                                              (3 Points)                    [ ] Bed bound for more than 72 hours                 (2 Points)    DISEASE RELATED RISK FACTORS                                               GENDER SPECIFIC FACTORS  [ ] Edema in the lower extremities                       (1 Point)              [ ] Pregnancy                                                     (1 Point)  [ ] Varicose veins                                               (1 Point)                     [ ] Post-partum < 6 weeks                                   (1 Point)             [ ] BMI > 25 Kg/m2                                            (1 Point)                     [ ] Hormonal therapy  or oral contraception          (1 Point)                 [ ] Sepsis (in the previous month)                        (1 Point)               [ ] History of pregnancy complications                 (1 point)  [ ] Pneumonia or serious lung disease                                               [ ] Unexplained or recurrent                     (1 Point)           (in the previous month)                               (1 Point)  [ ] Abnormal pulmonary function test                     (1 Point)                 SURGERY RELATED RISK FACTORS  [ ] Acute myocardial infarction                              (1 Point)               [ ]  Section                                             (1 Point)  [ ] Congestive heart failure (in the previous month)  (1 Point)      [ ] Minor surgery                                                  (1 Point)   [ ] Inflammatory bowel disease                             (1 Point)               [ ] Arthroscopic surgery                                        (2 Points)  [ ] Central venous access                                      (2 Points)                [ ] General surgery lasting more than 45 minutes (2 points)  [ ] Malignancy- Present or previous                   (2 Points)                [ ] Elective arthroplasty                                         (5 points)    [ ] Stroke (in the previous month)                          (5 Points)                                                                                                                                                           HEMATOLOGY RELATED FACTORS                                                 TRAUMA RELATED RISK FACTORS  [ ] Prior episodes of VTE                                     (3 Points)                [ ] Fracture of the hip, pelvis, or leg                       (5 Points)  [ ] Positive family history for VTE                         (3 Points)             [ ] Acute spinal cord injury (in the previous month)  (5 Points)  [ ] Prothrombin 85692 A                                     (3 Points)               [ ] Paralysis  (less than 1 month)                             (5 Points)  [ ] Factor V Leiden                                             (3 Points)                  [ ] Multiple Trauma within 1 month                        (5 Points)  [ ] Lupus anticoagulants                                     (3 Points)                                                           [ ] Anticardiolipin antibodies                               (3 Points)                                                       [ ] High homocysteine in the blood                      (3 Points)                                             [ ] Other congenital or acquired thrombophilia      (3 Points)                                                [ ] Heparin induced thrombocytopenia                  (3 Points)                                     Total Score [    4      ]

## 2022-10-12 NOTE — H&P PST ADULT - LAST ECHOCARDIOGRAM
(12/22/20): LVEF 72%, LA 4.2cm, LVEF 72%, mild LVH, ascending aorta dilated 4.3cm, trace TR, RVSP 37-42 mmHg

## 2022-10-12 NOTE — H&P PST ADULT - PROBLEM SELECTOR PLAN 4
Daily Note     Today's date: 2021  Patient name: Maty Macedo  : 1955  MRN: [id number]  Referring provider: Lelia Garcia DO  Dx:   Encounter Diagnosis     ICD-10-CM    1  Closed fracture of proximal end of right tibia, unspecified fracture morphology, initial encounter  S82 101A                   Subjective: Pt states " I feel great " Pt reports she feels better since getting injection in her LB  Pt reports no pain R LE  Objective: See treatment diary below      Assessment: Tolerated treatment well  Some verbal cues needed to perform exercises correctly  No pain reported t/o session  See re-eval today dated 21 for assessment  Patient would benefit from continued PT      Plan: Continue per plan of care        Re-eval Date: 6/3/21    Date 21   Visit Count 11 12 13 1 10   FOTO              Precautions: WBAT        Manuals 21   R knee flex/ext     Srikanth HS/Piri/Add  10'           Neuro Re-Ed         Balance as appropriate      Held      Romberg   Foam   15" x 3 EO   Romberg foam   30" x 1 EO  30" x 2 EC Held      Tandem   Foam EO   15" x 1 ea  Tandem foam   EO  30" x 2  Held      SLS Firm  10" x 2 EO  SLS   15" x 2 EO Held           Ther Ex 21   NuStep  L1 10' L 1  10 min  L3  10' L 3  10 min  L 1  10'   SBB Standing L/S extension against wall    5" x 5 to start  5" x 5 at end   Prone press ups Pain- unable       Right lateral side glides against wall 10x       Left lateral side glides against wall 3x10       PPT     5" x 15   HR/TR  1 x 20 ea   foam 1 x 15 ea   Foam  1 x 20 ea foam  Held   Standing hip flex/abd/ext srikanth   1 x 15 ea R LE 1 x 15 ea   R LE only   Foam 1 x 15 ea L LE foam Held   Squats        Step-ups    C Fwd   1 x 15  C Fwd   1 x 20 Held   T-band TKE        SAQ   > LAQ LAQ   5" x 20  LAQ  5" 1 x 20  LAQ   5" x 20  LAQ   5" x 20  Held   Hip add  5" 1 x 20 5" x 20  5"x 20 Held   Hip abd Ankle tband: all planes  Red inv/ever  1 x 10 ea   Green   DF/PF 1 x 10 ea  Red In/Ev x 10   Green DF/PF x 10  Red inv/ev  1 x 20   Green   DF/PF 1 x 20 Held   SLR 2x10 1 x 10 1 x 15 2 x 10  Held   S/L SLR  2x10 2 x 10  1 x 15 2 x 10  Held   Bridge  2x10 1 x 10  1 x 15  2 x 10  Held   Supine hip rolls to right                                Ther Activity                        Gait Training        With use of SPC    NP Pt declined  Held           Modalities        CP prn  Declined Pt declined  Declined Declined  Declined will alert [facility]

## 2022-10-12 NOTE — H&P PST ADULT - PROBLEM SELECTOR PLAN 1
scheduled pacemaker lead extraction, pacemaker insertion [employer] on 10/25/22.  -preop instructions given  -Labs: CBC, BMP, A1c, T&S, Jayro done in PST

## 2022-10-12 NOTE — H&P PST ADULT - NSICDXPASTMEDICALHX_GEN_ALL_CORE_FT
PAST MEDICAL HISTORY:  HTN (hypertension)     Hypothyroidism     Obesity, mild     Obstructive sleep apnea on CPAP     Status cardiac pacemaker

## 2022-10-12 NOTE — H&P PST ADULT - HISTORY OF PRESENT ILLNESS
57year old male with history of HTN, obesity, KAYY with CPAP, hypothyroidism and congential heart block, s/p dual chamber ppm 2004 and generator replacement 11/19/2021 @ [facility]. As per pt, Previous interrogation found abnormalities and lead malfunction, pt remain asymptomatic. Pt evaluated by Dr. Blanchard for a scheduled pacemaker lead extraction, pacemaker insertion [employer] on 10/25/22. Pt denies C/p, SOB, or dizziness at this time. Pt recently returned from travel abroad from [place], denies sick contact, or covid infection.  **Covid swab on 10/22 at [facility]

## 2022-10-12 NOTE — H&P PST ADULT - NSICDXPASTSURGICALHX_GEN_ALL_CORE_FT
PAST SURGICAL HISTORY:  H/O cardiac pacemaker last exchanged 11/2021 @ [facility]    H/O right knee surgery     History of total right hip replacement

## 2022-10-21 ENCOUNTER — NON-APPOINTMENT (OUTPATIENT)
Age: 57
End: 2022-10-21

## 2022-10-22 ENCOUNTER — OUTPATIENT (OUTPATIENT)
Dept: OUTPATIENT SERVICES | Facility: HOSPITAL | Age: 57
LOS: 1 days | End: 2022-10-22
Payer: COMMERCIAL

## 2022-10-22 DIAGNOSIS — Z96.641 PRESENCE OF RIGHT ARTIFICIAL HIP JOINT: Chronic | ICD-10-CM

## 2022-10-22 DIAGNOSIS — Z11.52 ENCOUNTER FOR SCREENING FOR COVID-19: ICD-10-CM

## 2022-10-22 DIAGNOSIS — Z98.890 OTHER SPECIFIED POSTPROCEDURAL STATES: Chronic | ICD-10-CM

## 2022-10-22 DIAGNOSIS — Z95.0 PRESENCE OF CARDIAC PACEMAKER: Chronic | ICD-10-CM

## 2022-10-22 LAB — SARS-COV-2 RNA SPEC QL NAA+PROBE: SIGNIFICANT CHANGE UP

## 2022-10-22 PROCEDURE — U0003: CPT

## 2022-10-22 PROCEDURE — C9803: CPT

## 2022-10-22 PROCEDURE — U0005: CPT

## 2022-10-25 ENCOUNTER — INPATIENT (INPATIENT)
Facility: HOSPITAL | Age: 57
LOS: 0 days | Discharge: HOME | DRG: 242 | End: 2022-10-25
Attending: INTERNAL MEDICINE | Admitting: INTERNAL MEDICINE
Payer: COMMERCIAL

## 2022-10-25 ENCOUNTER — TRANSCRIPTION ENCOUNTER (OUTPATIENT)
Age: 57
End: 2022-10-25

## 2022-10-25 VITALS
SYSTOLIC BLOOD PRESSURE: 130 MMHG | TEMPERATURE: 98 F | RESPIRATION RATE: 18 BRPM | DIASTOLIC BLOOD PRESSURE: 74 MMHG | HEART RATE: 104 BPM | OXYGEN SATURATION: 96 %

## 2022-10-25 VITALS
DIASTOLIC BLOOD PRESSURE: 74 MMHG | SYSTOLIC BLOOD PRESSURE: 116 MMHG | TEMPERATURE: 98 F | OXYGEN SATURATION: 98 % | HEIGHT: 71 IN | HEART RATE: 68 BPM | RESPIRATION RATE: 16 BRPM | WEIGHT: 315 LBS

## 2022-10-25 DIAGNOSIS — Z98.890 OTHER SPECIFIED POSTPROCEDURAL STATES: Chronic | ICD-10-CM

## 2022-10-25 DIAGNOSIS — Z95.0 PRESENCE OF CARDIAC PACEMAKER: Chronic | ICD-10-CM

## 2022-10-25 DIAGNOSIS — Z96.641 PRESENCE OF RIGHT ARTIFICIAL HIP JOINT: Chronic | ICD-10-CM

## 2022-10-25 DIAGNOSIS — T82.110A BREAKDOWN (MECHANICAL) OF CARDIAC ELECTRODE, INITIAL ENCOUNTER: ICD-10-CM

## 2022-10-25 LAB — RH IG SCN BLD-IMP: POSITIVE — SIGNIFICANT CHANGE UP

## 2022-10-25 PROCEDURE — 71045 X-RAY EXAM CHEST 1 VIEW: CPT | Mod: 26

## 2022-10-25 PROCEDURE — C9399: CPT

## 2022-10-25 PROCEDURE — C1785: CPT

## 2022-10-25 PROCEDURE — 76000 FLUOROSCOPY <1 HR PHYS/QHP: CPT

## 2022-10-25 PROCEDURE — 33208 INSRT HEART PM ATRIAL & VENT: CPT

## 2022-10-25 PROCEDURE — 71045 X-RAY EXAM CHEST 1 VIEW: CPT

## 2022-10-25 PROCEDURE — C1887: CPT

## 2022-10-25 PROCEDURE — 33235 REMOVAL PACEMAKER ELECTRODE: CPT

## 2022-10-25 PROCEDURE — 33233 REMOVAL OF PM GENERATOR: CPT

## 2022-10-25 PROCEDURE — C1773: CPT

## 2022-10-25 PROCEDURE — C2629: CPT

## 2022-10-25 PROCEDURE — C1894: CPT

## 2022-10-25 PROCEDURE — 93010 ELECTROCARDIOGRAM REPORT: CPT | Mod: 76

## 2022-10-25 PROCEDURE — 93005 ELECTROCARDIOGRAM TRACING: CPT

## 2022-10-25 PROCEDURE — C1730: CPT

## 2022-10-25 PROCEDURE — C1889: CPT

## 2022-10-25 PROCEDURE — C1892: CPT

## 2022-10-25 PROCEDURE — 82962 GLUCOSE BLOOD TEST: CPT

## 2022-10-25 PROCEDURE — C1769: CPT

## 2022-10-25 PROCEDURE — 93286 PERI-PX EVAL PM/LDLS PM IP: CPT | Mod: 26,59

## 2022-10-25 PROCEDURE — C1898: CPT

## 2022-10-25 PROCEDURE — 86923 COMPATIBILITY TEST ELECTRIC: CPT

## 2022-10-25 DEVICE — ENVELOPE TYRX ABSORBABLE ANTIBACTERIAL LG
Type: IMPLANTABLE DEVICE | Status: NON-FUNCTIONAL
Removed: 2022-10-25

## 2022-10-25 DEVICE — LEAD SELECT SECURE 69
Type: IMPLANTABLE DEVICE | Status: NON-FUNCTIONAL
Removed: 2022-10-25

## 2022-10-25 DEVICE — CATH EP QUAD SUP JSN 5FRX120CM
Type: IMPLANTABLE DEVICE | Status: NON-FUNCTIONAL
Removed: 2022-10-25

## 2022-10-25 DEVICE — GWIRE ANGL .035X80 STIFF
Type: IMPLANTABLE DEVICE | Status: NON-FUNCTIONAL
Removed: 2022-10-25

## 2022-10-25 DEVICE — SHEATH PRELUDE 9FX25CM
Type: IMPLANTABLE DEVICE | Status: NON-FUNCTIONAL
Removed: 2022-10-25

## 2022-10-25 DEVICE — DEVICE LOCKING LOCKING LLD EZ CLEARS
Type: IMPLANTABLE DEVICE | Status: NON-FUNCTIONAL
Removed: 2022-10-25

## 2022-10-25 DEVICE — SHEATH PRELUDE 7FX25CM
Type: IMPLANTABLE DEVICE | Status: NON-FUNCTIONAL
Removed: 2022-10-25

## 2022-10-25 DEVICE — SHEATH GLIDELIGHT LASER 16FR
Type: IMPLANTABLE DEVICE | Status: NON-FUNCTIONAL
Removed: 2022-10-25

## 2022-10-25 DEVICE — SHEATH INTRODUCER TERUMO PINNACLE CORONARY 5FR X 10CM X 0.038" MINI WIRE
Type: IMPLANTABLE DEVICE | Status: NON-FUNCTIONAL
Removed: 2022-10-25

## 2022-10-25 DEVICE — LEAD PACE VENT CAPSUR Z 52CM
Type: IMPLANTABLE DEVICE | Status: NON-FUNCTIONAL
Removed: 2022-10-25

## 2022-10-25 DEVICE — CATH RIGHTSITE HIS02
Type: IMPLANTABLE DEVICE | Status: NON-FUNCTIONAL
Removed: 2022-10-25

## 2022-10-25 DEVICE — KIT BRIDGE ACESSORY
Type: IMPLANTABLE DEVICE | Status: NON-FUNCTIONAL
Removed: 2022-10-25

## 2022-10-25 DEVICE — INTRO SHEATH PEELWY 7FRX23CM MIN PURCHASE OF 10
Type: IMPLANTABLE DEVICE | Status: NON-FUNCTIONAL
Removed: 2022-10-25

## 2022-10-25 DEVICE — GUIDEWIRE GLIDEWIRE ANGLED TIP 0.035" X 150CM LONG TAPER
Type: IMPLANTABLE DEVICE | Status: NON-FUNCTIONAL
Removed: 2022-10-25

## 2022-10-25 DEVICE — PACE DUAL ACCOLADE DR EL MRI
Type: IMPLANTABLE DEVICE | Status: NON-FUNCTIONAL
Removed: 2022-10-25

## 2022-10-25 DEVICE — INTRO MICROPUNC STIFF 5FRX10CM
Type: IMPLANTABLE DEVICE | Status: NON-FUNCTIONAL
Removed: 2022-10-25

## 2022-10-25 DEVICE — SURGICEL FIBRILLAR 2 X 4"
Type: IMPLANTABLE DEVICE | Status: NON-FUNCTIONAL
Removed: 2022-10-25

## 2022-10-25 RX ORDER — OXYCODONE HYDROCHLORIDE 5 MG/1
1 TABLET ORAL
Qty: 16 | Refills: 0
Start: 2022-10-25 | End: 2022-10-28

## 2022-10-25 RX ORDER — ACETAMINOPHEN 500 MG
2 TABLET ORAL
Qty: 0 | Refills: 0 | DISCHARGE
Start: 2022-10-25

## 2022-10-25 RX ORDER — LEVOTHYROXINE SODIUM 125 MCG
1 TABLET ORAL
Qty: 0 | Refills: 0 | DISCHARGE

## 2022-10-25 RX ORDER — HYDROMORPHONE HYDROCHLORIDE 2 MG/ML
0.5 INJECTION INTRAMUSCULAR; INTRAVENOUS; SUBCUTANEOUS
Refills: 0 | Status: DISCONTINUED | OUTPATIENT
Start: 2022-10-25 | End: 2022-10-25

## 2022-10-25 RX ORDER — OXYCODONE HYDROCHLORIDE 5 MG/1
10 TABLET ORAL EVERY 6 HOURS
Refills: 0 | Status: DISCONTINUED | OUTPATIENT
Start: 2022-10-25 | End: 2022-10-25

## 2022-10-25 RX ORDER — LOSARTAN/HYDROCHLOROTHIAZIDE 100MG-25MG
1 TABLET ORAL
Qty: 0 | Refills: 0 | DISCHARGE

## 2022-10-25 RX ORDER — ONDANSETRON 8 MG/1
4 TABLET, FILM COATED ORAL ONCE
Refills: 0 | Status: DISCONTINUED | OUTPATIENT
Start: 2022-10-25 | End: 2022-10-25

## 2022-10-25 RX ORDER — LIDOCAINE HCL 20 MG/ML
0.2 VIAL (ML) INJECTION ONCE
Refills: 0 | Status: DISCONTINUED | OUTPATIENT
Start: 2022-10-25 | End: 2022-10-25

## 2022-10-25 RX ORDER — OXYCODONE HYDROCHLORIDE 5 MG/1
5 TABLET ORAL EVERY 6 HOURS
Refills: 0 | Status: DISCONTINUED | OUTPATIENT
Start: 2022-10-25 | End: 2022-10-25

## 2022-10-25 RX ORDER — ACETAMINOPHEN 500 MG
650 TABLET ORAL EVERY 6 HOURS
Refills: 0 | Status: DISCONTINUED | OUTPATIENT
Start: 2022-10-25 | End: 2022-10-25

## 2022-10-25 RX ORDER — SODIUM CHLORIDE 9 MG/ML
3 INJECTION INTRAMUSCULAR; INTRAVENOUS; SUBCUTANEOUS EVERY 8 HOURS
Refills: 0 | Status: DISCONTINUED | OUTPATIENT
Start: 2022-10-25 | End: 2022-10-25

## 2022-10-25 NOTE — ASU DISCHARGE PLAN (ADULT/PEDIATRIC) - CARE PROVIDER_API CALL
Reyna Blanchard (MD)  Cardiac Electrophysiology; Cardiovascular Disease; Internal Medicine  [address] [place] [address]  Phone: [phone]  Fax: [phone]  Follow Up Time:

## 2022-10-25 NOTE — ASU DISCHARGE PLAN (ADULT/PEDIATRIC) - ASU DC SPECIAL INSTRUCTIONSFT
* See pre printed instructions    WOUND CARE:  Do NOT scrub, rub, or pick at your incision site  AFTER 3 DAYS you may SHOWER  - use mild soap and gentle warm, water stream, pat dry  DO NOT apply lotions, creams, ointments, powder, perfumes to your incision site  DO NOT SOAK your site for 4-6 weeks ( no baths, no pools, no tubs, etc...)  wear loose clothing around site for 1-2 weeks  IF surgical tape was used DO NOT remove the strips, they will fall off after 7days, if glue was used, it will naturally fall off within 3 weeks  if staples were used, they will be removed in 7-10 days by your doctor    ACTIVITY:  for 2 weeks AFTER  your procedure  - DO NOT RAISE your arm above shoulder level ( on the same side of your incision)  for 4 weeks AFTER your procedure   - DO NOT LIFT anything 10 lbs or heavier ( on the side of your impalnt)   - certain activities may be limited longer, those that involve swinging your arm, and will be discussed with your EP doctor  DO NOT DRIVE until your EP Doctor or nurse practitioner/ physician assistant states it is safe to do so  A follow up appointment in 7-14 days will be arranged before your discharge    ID CARD:   you will receive an ID CARD and device company booklet   - please carry that card with you at all times    ***CALL YOUR DOCTOR ***  IF you have fever, chills, body aches, or severe pain, swelling, redness, heat, yellow drainage from your incision site  IF bleeding  or significant new swelling from your puncture site  IF your experience lightheadedness, dizziness, or fainting spell.  IF unable to get in contact with your doctor, you may call the Cardiology Office at [facility] at [phone]

## 2022-10-25 NOTE — PATIENT PROFILE ADULT - FALL HARM RISK - UNIVERSAL INTERVENTIONS
Bed in lowest position, wheels locked, appropriate side rails in place/Call bell, personal items and telephone in reach/Instruct patient to call for assistance before getting out of bed or chair/Non-slip footwear when patient is out of bed/[place] to call system/Physically safe environment - no spills, clutter or unnecessary equipment/Purposeful Proactive Rounding/Room/bathroom lighting operational, light cord in reach

## 2022-10-25 NOTE — ASU DISCHARGE PLAN (ADULT/PEDIATRIC) - NS MD DC FALL RISK RISK
For information on Fall & Injury Prevention, visit: [email or web address] OR  [email or web address] OR  https://www.cdc.gov/steadi/patient.html

## 2022-10-25 NOTE — PRE-OP CHECKLIST - DENTURES
· Continue with routine wound care as discussed  · Gradually increase activities as tolerated  · Follow-up in 2 weeks or as needed; please call with questions or concerns no

## 2022-10-27 ENCOUNTER — APPOINTMENT (OUTPATIENT)
Dept: ELECTROPHYSIOLOGY | Facility: CLINIC | Age: 57
End: 2022-10-27

## 2022-10-27 ENCOUNTER — NON-APPOINTMENT (OUTPATIENT)
Age: 57
End: 2022-10-27

## 2022-10-27 VITALS — SYSTOLIC BLOOD PRESSURE: 124 MMHG | DIASTOLIC BLOOD PRESSURE: 81 MMHG | HEART RATE: 79 BPM | OXYGEN SATURATION: 99 %

## 2022-10-27 PROCEDURE — 93280 PM DEVICE PROGR EVAL DUAL: CPT

## 2022-10-27 PROCEDURE — 93000 ELECTROCARDIOGRAM COMPLETE: CPT | Mod: 59

## 2022-10-27 PROCEDURE — 99024 POSTOP FOLLOW-UP VISIT: CPT

## 2022-10-27 RX ORDER — APIXABAN 5 MG/1
5 TABLET, FILM COATED ORAL
Qty: 60 | Refills: 3 | Status: ACTIVE | COMMUNITY
Start: 2022-10-27

## 2022-10-27 NOTE — HISTORY OF PRESENT ILLNESS
[FreeTextEntry1] : Mr. Ching is a 57 year old gentleman with history of HTN, obesity, KAYY, pulm htn, hypothyroidism, and congenital heart block s/p dual chamber ppm 2004 and generator replacement 11/19/2021. There was evidence of pacemaker lead malfunction. He recently underwent extraction the complete pacemaker system with re-implantation of pacemaker with new RA and RV lead on 10/25/2022. He presents today prompted by swelling in the L arm.\par \par Overall, he is doing well post procedure. However, he woke up this morning feeling tightness around the left arm and fingers. He has attempted elevating his arm with some relief but no significant changes. Denies any pain, paresthesia. No chest pain, SOB, palpitations, lightheadedness or dizziness.\par \par Device interrogation revealed normal function with adequate sensing and pacing threshold. No events for review. A paced 63% and V paced 99%, he is pacemaker dependent. Underlying rhythm is SR with CHB without any junctional escape. The L infraclavicular surgical incision site healing well. The dressing is still intact, suggested he take the dressing off tomorrow. His left arm is noted with significant swelling with tightness. No pitting edema.

## 2022-10-27 NOTE — DISCUSSION/SUMMARY
[FreeTextEntry1] : Mr. Ching is a 57 year old gentleman with history of HTN, obesity, KAYY, pulm htn, hypothyroidism, and congenital heart block s/p dual chamber ppm 2004 and generator replacement 11/19/2021. He recently underwent extraction the complete pacemaker system with re-implantation of pacemaker with new RA and RV lead on 10/25/2022. Now presenting with LA swelling suggesting possible thrombus. He is started on Eliquis and will re-evaluate further during his upcoming wound follow up. \par

## 2022-10-27 NOTE — PHYSICAL EXAM
[Well Developed] : well developed [Well Nourished] : well nourished [No Acute Distress] : no acute distress [Normal Conjunctiva] : normal conjunctiva [Normal Venous Pressure] : normal venous pressure [No Carotid Bruit] : no carotid bruit [Normal S1, S2] : normal S1, S2 [No Murmur] : no murmur [No Rub] : no rub [No Gallop] : no gallop [Clear Lung Fields] : clear lung fields [Good Air Entry] : good air entry [No Respiratory Distress] : no respiratory distress  [Soft] : abdomen soft [Non Tender] : non-tender [No Masses/organomegaly] : no masses/organomegaly [Normal Bowel Sounds] : normal bowel sounds [Normal Gait] : normal gait [No Cyanosis] : no cyanosis [No Clubbing] : no clubbing [No Varicosities] : no varicosities [Moves all extremities] : moves all extremities [No Focal Deficits] : no focal deficits [Normal Speech] : normal speech [Alert and Oriented] : alert and oriented [Normal memory] : normal memory [de-identified] : swelling in the left arm [de-identified] : swelling in the L arm

## 2022-10-27 NOTE — REVIEW OF SYSTEMS
Ventricular Rate : 96  Atrial Rate : 96  P-R Interval : 162  QRS Duration : 110  Q-T Interval : 402  QTC Calculation(Bazett) : 507  P Axis : 57  R Axis : -46  T Axis : 113  Diagnosis : Normal sinus rhythm  Possible Left atrial enlargement  Left axis deviation  Possible Anterior infarct (cited on or before 12-JAN-2019)  Prolonged QT  Abnormal ECG  When compared with ECG of 11-APR-2019 09:49,  Non-specific change in ST segment in Lateral leads  Confirmed by HAILEY NERI ([id number]) on 12/17/2019 12:48:34 PM   [Negative] : Heme/Lymph [de-identified] : see HPI

## 2022-11-10 ENCOUNTER — NON-APPOINTMENT (OUTPATIENT)
Age: 57
End: 2022-11-10

## 2022-11-10 ENCOUNTER — APPOINTMENT (OUTPATIENT)
Dept: ELECTROPHYSIOLOGY | Facility: CLINIC | Age: 57
End: 2022-11-10

## 2022-11-10 VITALS
HEIGHT: 71 IN | DIASTOLIC BLOOD PRESSURE: 79 MMHG | HEART RATE: 76 BPM | SYSTOLIC BLOOD PRESSURE: 121 MMHG | WEIGHT: 315 LBS | OXYGEN SATURATION: 99 % | BODY MASS INDEX: 44.1 KG/M2

## 2022-11-10 PROCEDURE — 99024 POSTOP FOLLOW-UP VISIT: CPT

## 2022-11-10 PROCEDURE — 93280 PM DEVICE PROGR EVAL DUAL: CPT

## 2022-12-05 ENCOUNTER — APPOINTMENT (OUTPATIENT)
Dept: ELECTROPHYSIOLOGY | Facility: CLINIC | Age: 57
End: 2022-12-05

## 2023-01-16 ENCOUNTER — APPOINTMENT (OUTPATIENT)
Dept: PULMONOLOGY | Facility: CLINIC | Age: 58
End: 2023-01-16
Payer: COMMERCIAL

## 2023-01-16 VITALS
BODY MASS INDEX: 44.1 KG/M2 | HEIGHT: 71 IN | HEART RATE: 74 BPM | DIASTOLIC BLOOD PRESSURE: 68 MMHG | SYSTOLIC BLOOD PRESSURE: 141 MMHG | OXYGEN SATURATION: 97 % | WEIGHT: 315 LBS

## 2023-01-16 DIAGNOSIS — Z99.89 OBSTRUCTIVE SLEEP APNEA (ADULT) (PEDIATRIC): ICD-10-CM

## 2023-01-16 DIAGNOSIS — G47.33 OBSTRUCTIVE SLEEP APNEA (ADULT) (PEDIATRIC): ICD-10-CM

## 2023-01-16 PROCEDURE — 99203 OFFICE O/P NEW LOW 30 MIN: CPT

## 2023-01-16 NOTE — HISTORY OF PRESENT ILLNESS
[Never] : never [TextBox_4] : 57M KAYY on cpap for about 20 years, [id number], sleeps great.  Current cpap is about 10 years old, needs new one.\par \par also has a history of congenital heart block , has packemaker, obese.  No pulm hx

## 2023-01-24 ENCOUNTER — APPOINTMENT (OUTPATIENT)
Dept: PULMONOLOGY | Facility: CLINIC | Age: 58
End: 2023-01-24
Payer: COMMERCIAL

## 2023-01-24 PROCEDURE — ZZZZZ: CPT

## 2023-02-06 ENCOUNTER — APPOINTMENT (OUTPATIENT)
Dept: ELECTROPHYSIOLOGY | Facility: CLINIC | Age: 58
End: 2023-02-06

## 2023-02-10 ENCOUNTER — APPOINTMENT (OUTPATIENT)
Dept: PULMONOLOGY | Facility: CLINIC | Age: 58
End: 2023-02-10
Payer: COMMERCIAL

## 2023-02-10 PROCEDURE — 95800 SLP STDY UNATTENDED: CPT | Mod: 52

## 2023-02-12 PROCEDURE — 95800 SLP STDY UNATTENDED: CPT

## 2023-03-03 ENCOUNTER — APPOINTMENT (OUTPATIENT)
Dept: PULMONOLOGY | Facility: CLINIC | Age: 58
End: 2023-03-03
Payer: COMMERCIAL

## 2023-03-03 VITALS
DIASTOLIC BLOOD PRESSURE: 83 MMHG | OXYGEN SATURATION: 96 % | HEART RATE: 78 BPM | SYSTOLIC BLOOD PRESSURE: 142 MMHG | BODY MASS INDEX: 44.1 KG/M2 | HEIGHT: 71 IN | WEIGHT: 315 LBS

## 2023-03-03 DIAGNOSIS — G47.33 OBSTRUCTIVE SLEEP APNEA (ADULT) (PEDIATRIC): ICD-10-CM

## 2023-03-03 PROCEDURE — 99213 OFFICE O/P EST LOW 20 MIN: CPT

## 2023-08-17 ENCOUNTER — NON-APPOINTMENT (OUTPATIENT)
Age: 58
End: 2023-08-17

## 2023-11-20 ENCOUNTER — APPOINTMENT (OUTPATIENT)
Dept: FAMILY MEDICINE | Facility: CLINIC | Age: 58
End: 2023-11-20

## (undated) DEVICE — NDL HYPO SAFE 25G X 5/8" (ORANGE)

## (undated) DEVICE — DRAPE 3/4 SHEET W REINFORCEMENT 56X77"

## (undated) DEVICE — WRENCH TORQUE BIDIRECTIONAL DISP

## (undated) DEVICE — DRAPE MAYO STAND 30"

## (undated) DEVICE — TUBING CONTRAST INJECTION HIGH PRESSURE 1200PSI 72"

## (undated) DEVICE — SOL NORMOSOL-R PH7.4 1000ML

## (undated) DEVICE — BLADE SCALPEL SAFETYLOCK #11

## (undated) DEVICE — WARMING BLANKET DUO-THERM HYPER/HYPOTHERM ADULT

## (undated) DEVICE — DRAPE 1/2 SHEET 40X57"

## (undated) DEVICE — SOL IRR POUR NS 0.9% 500ML

## (undated) DEVICE — GLV 7 PROTEXIS (WHITE)

## (undated) DEVICE — CABLE SUPREME QUADRIPOLAR (BLACK)

## (undated) DEVICE — SUT SURGICAL STEEL 6 30" BP-1

## (undated) DEVICE — DRSG OPSITE 13.75 X 4"

## (undated) DEVICE — SUT SOFSILK 0 18" TIES

## (undated) DEVICE — SLITTER UNIVERSAL

## (undated) DEVICE — NDL HYPO REGULAR BEVEL 22G X 1.5" (TURQUOISE)

## (undated) DEVICE — SAW BLADE MICROAIRE STERNUM 1X34X9.4MM

## (undated) DEVICE — FIXATION TOOL

## (undated) DEVICE — SUT STAINLESS STEEL 5 18" SCC

## (undated) DEVICE — POSITIONER FOAM EGG CRATE ULNAR 2PCS (PINK)

## (undated) DEVICE — SUT POLYSORB 2-0 30" GS-21 UNDYED

## (undated) DEVICE — SYR LUER LOK 10CC

## (undated) DEVICE — SUT DOUBLE 6 WIRE STERNAL

## (undated) DEVICE — DRAPE C ARM UNIVERSAL

## (undated) DEVICE — DRAPE IOBAN 23" X 23"

## (undated) DEVICE — SOL IRR POUR H2O 250ML

## (undated) DEVICE — SUT BIOSYN 4-0 18" P-12

## (undated) DEVICE — PACING CABLE TEMP MEDTRONIC WITH PAC-LOC

## (undated) DEVICE — DEFIBRILLATOR PAD PRE-CONNECT ADULT/CHILD

## (undated) DEVICE — SUCTION YANKAUER NO CONTROL VENT

## (undated) DEVICE — VISITEC 4X4

## (undated) DEVICE — SPECIMEN CONTAINER 100ML

## (undated) DEVICE — DRSG DERMABOND PRINEO 60CM

## (undated) DEVICE — SUT VICRYL 2-0 27" CT-1 UNDYED

## (undated) DEVICE — SUT SOFSILK 0 30" V-20

## (undated) DEVICE — GLV 8.5 PROTEXIS (WHITE)

## (undated) DEVICE — SUT BLUNT SZ 5

## (undated) DEVICE — STEALTH CLAMP INSERT FIBRA/FIBRA 60MM

## (undated) DEVICE — BLADE SCALPEL SAFETYLOCK #15

## (undated) DEVICE — SUT ETHIBOND 0 44" EN3

## (undated) DEVICE — PACING CABLE SURGICAL 12FT WITH SMALL CLIP

## (undated) DEVICE — LAP PAD 18 X 18"

## (undated) DEVICE — PREP CHLORAPREP HI-LITE ORANGE 26ML

## (undated) DEVICE — GLV 8 PROTEXIS (WHITE)

## (undated) DEVICE — STEALTH CLAMP INSERT FIBRA/FIBRA 90MM

## (undated) DEVICE — SUT PLEDGET PRE PUNCH 4.8 X 9.5 X 1.5 MM

## (undated) DEVICE — BLADE SCALPEL SAFETYLOCK #10

## (undated) DEVICE — SYR ASEPTO

## (undated) DEVICE — VENODYNE/SCD SLEEVE CALF LARGE

## (undated) DEVICE — STAPLER SKIN VISI-STAT 35 WIDE

## (undated) DEVICE — FOLEY TRAY 16FR 5CC LF LUBRISIL ADVANCE TEMP CLOSED

## (undated) DEVICE — GLV 7.5 PROTEXIS (WHITE)

## (undated) DEVICE — WARMING BLANKET FULL UNDERBODY

## (undated) DEVICE — TUBING BRAT 2 SUCTION ASSEMBLY TWIST LOCK

## (undated) DEVICE — MEDICATION LABELS W MARKER

## (undated) DEVICE — DRSG TEGADERM 4X4.75"

## (undated) DEVICE — GLV 6.5 PROTEXIS (WHITE)

## (undated) DEVICE — SUT SOFSILK 2-0 18" TIES

## (undated) DEVICE — PACK UNIVERSAL CARDIAC

## (undated) DEVICE — SUT POLYSORB 0 36" GS-25 UNDYED

## (undated) DEVICE — DRAPE TOWEL BLUE 17" X 24"

## (undated) DEVICE — DRAPE INSTRUMENT POUCH 6.75" X 11"

## (undated) DEVICE — DRSG CURITY GAUZE SPONGE 4 X 4" 12-PLY